# Patient Record
Sex: FEMALE | URBAN - METROPOLITAN AREA
[De-identification: names, ages, dates, MRNs, and addresses within clinical notes are randomized per-mention and may not be internally consistent; named-entity substitution may affect disease eponyms.]

---

## 2024-09-30 ENCOUNTER — TRANSCRIBE ORDERS (OUTPATIENT)
Dept: ADMINISTRATIVE | Age: 64
End: 2024-09-30

## 2024-09-30 DIAGNOSIS — C43.9 MALIGNANT MELANOMA, UNSPECIFIED SITE (HCC): ICD-10-CM

## 2024-09-30 DIAGNOSIS — C79.31 SECONDARY MALIGNANT NEOPLASM OF BRAIN AND SPINAL CORD (HCC): Primary | ICD-10-CM

## 2024-09-30 DIAGNOSIS — C79.49 SECONDARY MALIGNANT NEOPLASM OF BRAIN AND SPINAL CORD (HCC): Primary | ICD-10-CM

## 2025-02-03 ENCOUNTER — CLINICAL DOCUMENTATION (OUTPATIENT)
Dept: RADIATION ONCOLOGY | Age: 65
End: 2025-02-03

## 2025-02-03 ENCOUNTER — TRANSCRIBE ORDERS (OUTPATIENT)
Dept: ADMINISTRATIVE | Age: 65
End: 2025-02-03

## 2025-02-03 DIAGNOSIS — C79.31 METASTASIS TO BRAIN (HCC): Primary | ICD-10-CM

## 2025-02-03 NOTE — PROGRESS NOTES
Patient was seen today for Gamma Knife consult as requested by Dr. Stallings.  After consultation patient has decided to go forward with Gamma Knife radiosurgery.  Patient also received Dr. Ling's biography.    Patient's allergies, medications, and medical and surgery history were reviewed.  Also reviewed pre-procedure instructions and provided patient with a written copy of the same.  Patient will call with date of his/her H&P.    We discussed the patient's date of procedure will be on 2/10/25.    All patient's questions were answered.     Encouraged to call with any further questions or concerns.    Nichelle Elias RN

## 2025-02-07 ENCOUNTER — PRE-PROCEDURE TELEPHONE (OUTPATIENT)
Dept: RADIATION ONCOLOGY | Age: 65
End: 2025-02-07

## 2025-02-07 RX ORDER — ONDANSETRON 8 MG/1
8 TABLET, ORALLY DISINTEGRATING ORAL EVERY 8 HOURS PRN
COMMUNITY

## 2025-02-07 RX ORDER — FLUTICASONE PROPIONATE 50 MCG
1 SPRAY, SUSPENSION (ML) NASAL DAILY PRN
COMMUNITY

## 2025-02-07 RX ORDER — CARVEDILOL 25 MG/1
25 TABLET ORAL 2 TIMES DAILY WITH MEALS
COMMUNITY

## 2025-02-07 RX ORDER — FLUTICASONE FUROATE, UMECLIDINIUM BROMIDE AND VILANTEROL TRIFENATATE 200; 62.5; 25 UG/1; UG/1; UG/1
1 POWDER RESPIRATORY (INHALATION) DAILY
COMMUNITY

## 2025-02-07 RX ORDER — LISINOPRIL 20 MG/1
20 TABLET ORAL DAILY
COMMUNITY

## 2025-02-07 RX ORDER — AMLODIPINE BESYLATE 5 MG/1
5 TABLET ORAL DAILY
COMMUNITY

## 2025-02-07 RX ORDER — ALBUTEROL SULFATE 90 UG/1
2 INHALANT RESPIRATORY (INHALATION) EVERY 6 HOURS PRN
COMMUNITY

## 2025-02-07 RX ORDER — ATORVASTATIN CALCIUM 80 MG/1
80 TABLET, FILM COATED ORAL DAILY
COMMUNITY

## 2025-02-07 RX ORDER — ASPIRIN 81 MG/1
81 TABLET, CHEWABLE ORAL DAILY
COMMUNITY

## 2025-02-07 RX ORDER — GABAPENTIN 400 MG/1
800 CAPSULE ORAL 3 TIMES DAILY
COMMUNITY

## 2025-02-07 RX ORDER — HYDROCHLOROTHIAZIDE 25 MG/1
25 TABLET ORAL DAILY
COMMUNITY

## 2025-02-07 NOTE — PROGRESS NOTES
INSTRUCTIONS FOR THE DAY OF GAMMA KNIFE PROCEDURE AT [facility]    1.  Arrive at 6:00 a.m. to register.  2.  DO NOT eat or drink anything after midnight the night before your procedure.  3.  Take all of your usual morning medications the day of the procedure with just a sip of water including dexamethasone, keppra, protonix.   4.  If you are on any blood thinners (Coumadin, Xarelto, Aspirin, Lovenox), you MAY TAKE those medication.  There is no need to stop these medications for your procedure.  5.  If you need pain medication during the night before or the morning of your procedure, you may take them with a sip of water.    6.  Bring a current list of all of your medications with you.  7.  Do not wear any make-up.  8.  Wear comfortable, loose-fitting clothing.  9.  Do not wear jewelry, belts, or any clothing that contains metal.  10.  Visitors will be limited to 1 per patient.    YOU MUST HAVE SOMEONE DRIVE YOU HOME AFTER YOUR PROCEDURE.    Reviewed all pre-procedure instructions with family member via telephone.  Answered all questions.    Maite Horta RN

## 2025-02-10 ENCOUNTER — HOSPITAL ENCOUNTER (OUTPATIENT)
Dept: RADIATION ONCOLOGY | Age: 65
Discharge: HOME | End: 2025-02-10
Payer: MEDICARE

## 2025-02-10 ENCOUNTER — ANESTHESIA (OUTPATIENT)
Dept: RADIATION ONCOLOGY | Age: 65
End: 2025-02-10
Payer: MEDICARE

## 2025-02-10 ENCOUNTER — HOSPITAL ENCOUNTER (OUTPATIENT)
Dept: MRI IMAGING | Age: 65
Discharge: HOME | End: 2025-02-10
Payer: MEDICARE

## 2025-02-10 ENCOUNTER — ANESTHESIA EVENT (OUTPATIENT)
Dept: RADIATION ONCOLOGY | Age: 65
End: 2025-02-10
Payer: MEDICARE

## 2025-02-10 VITALS
RESPIRATION RATE: 20 BRPM | BODY MASS INDEX: 29.26 KG/M2 | HEIGHT: 62 IN | OXYGEN SATURATION: 93 % | HEART RATE: 96 BPM | TEMPERATURE: 98.1 F | DIASTOLIC BLOOD PRESSURE: 69 MMHG | WEIGHT: 159 LBS | SYSTOLIC BLOOD PRESSURE: 152 MMHG

## 2025-02-10 DIAGNOSIS — C79.31 METASTASIS TO BRAIN (HCC): ICD-10-CM

## 2025-02-10 LAB
PERFORMED ON: NORMAL
POC CREATININE: 0.6 MG/DL (ref 0.6–1.2)
POC SAMPLE TYPE: NORMAL

## 2025-02-10 PROCEDURE — 6360000002 HC RX W HCPCS

## 2025-02-10 PROCEDURE — 77300 RADIATION THERAPY DOSE PLAN: CPT

## 2025-02-10 PROCEDURE — 2500000003 HC RX 250 WO HCPCS: Performed by: RADIOLOGY

## 2025-02-10 PROCEDURE — 6360000004 HC RX CONTRAST MEDICATION: Performed by: NEUROLOGICAL SURGERY

## 2025-02-10 PROCEDURE — 70553 MRI BRAIN STEM W/O & W/DYE: CPT

## 2025-02-10 PROCEDURE — A9576 INJ PROHANCE MULTIPACK: HCPCS | Performed by: NEUROLOGICAL SURGERY

## 2025-02-10 PROCEDURE — 77295 3-D RADIOTHERAPY PLAN: CPT

## 2025-02-10 PROCEDURE — 77371 SRS MULTISOURCE: CPT

## 2025-02-10 PROCEDURE — 2580000003 HC RX 258

## 2025-02-10 PROCEDURE — 82565 ASSAY OF CREATININE: CPT

## 2025-02-10 PROCEDURE — 7100000011 HC PHASE II RECOVERY - ADDTL 15 MIN

## 2025-02-10 PROCEDURE — 7100000010 HC PHASE II RECOVERY - FIRST 15 MIN

## 2025-02-10 PROCEDURE — 77334 RADIATION TREATMENT AID(S): CPT

## 2025-02-10 PROCEDURE — 3700000000 HC ANESTHESIA ATTENDED CARE: Performed by: ANESTHESIOLOGY

## 2025-02-10 PROCEDURE — 6360000002 HC RX W HCPCS: Performed by: RADIOLOGY

## 2025-02-10 PROCEDURE — 3700000001 HC ADD 15 MINUTES (ANESTHESIA): Performed by: ANESTHESIOLOGY

## 2025-02-10 RX ORDER — BUPIVACAINE HYDROCHLORIDE 5 MG/ML
30 INJECTION, SOLUTION EPIDURAL; INTRACAUDAL ONCE
Status: COMPLETED | OUTPATIENT
Start: 2025-02-10 | End: 2025-02-10

## 2025-02-10 RX ORDER — LIDOCAINE HYDROCHLORIDE 20 MG/ML
INJECTION, SOLUTION INFILTRATION; PERINEURAL
Status: DISCONTINUED | OUTPATIENT
Start: 2025-02-10 | End: 2025-02-10 | Stop reason: SDUPTHER

## 2025-02-10 RX ORDER — 0.9 % SODIUM CHLORIDE 0.9 %
500 INTRAVENOUS SOLUTION INTRAVENOUS ONCE
Status: DISCONTINUED | OUTPATIENT
Start: 2025-02-10 | End: 2025-02-11 | Stop reason: HOSPADM

## 2025-02-10 RX ORDER — ACETAMINOPHEN 325 MG/1
650 TABLET ORAL EVERY 4 HOURS PRN
Status: DISCONTINUED | OUTPATIENT
Start: 2025-02-10 | End: 2025-02-11 | Stop reason: HOSPADM

## 2025-02-10 RX ORDER — SODIUM CHLORIDE 9 MG/ML
INJECTION, SOLUTION INTRAMUSCULAR; INTRAVENOUS; SUBCUTANEOUS
Status: DISCONTINUED | OUTPATIENT
Start: 2025-02-10 | End: 2025-02-10 | Stop reason: SDUPTHER

## 2025-02-10 RX ORDER — SODIUM BICARBONATE 42 MG/ML
1.5 INJECTION, SOLUTION INTRAVENOUS ONCE
Status: COMPLETED | OUTPATIENT
Start: 2025-02-10 | End: 2025-02-10

## 2025-02-10 RX ORDER — LORAZEPAM 2 MG/ML
1 INJECTION INTRAMUSCULAR ONCE
OUTPATIENT
Start: 2025-02-10

## 2025-02-10 RX ORDER — HEPARIN 100 UNIT/ML
500 SYRINGE INTRAVENOUS
Status: COMPLETED | OUTPATIENT
Start: 2025-02-10 | End: 2025-02-10

## 2025-02-10 RX ORDER — ONDANSETRON 2 MG/ML
4 INJECTION INTRAMUSCULAR; INTRAVENOUS EVERY 6 HOURS PRN
Status: DISCONTINUED | OUTPATIENT
Start: 2025-02-10 | End: 2025-02-11 | Stop reason: HOSPADM

## 2025-02-10 RX ORDER — PROPOFOL 10 MG/ML
INJECTION, EMULSION INTRAVENOUS
Status: DISCONTINUED | OUTPATIENT
Start: 2025-02-10 | End: 2025-02-10 | Stop reason: SDUPTHER

## 2025-02-10 RX ORDER — LORAZEPAM 2 MG/ML
INJECTION INTRAMUSCULAR
Status: COMPLETED
Start: 2025-02-10 | End: 2025-02-10

## 2025-02-10 RX ORDER — NEOMYCIN/BACITRACIN/POLYMYXINB 3.5-400-5K
OINTMENT (GRAM) TOPICAL 2 TIMES DAILY
Status: DISCONTINUED | OUTPATIENT
Start: 2025-02-10 | End: 2025-02-11 | Stop reason: HOSPADM

## 2025-02-10 RX ORDER — DEXAMETHASONE SODIUM PHOSPHATE 4 MG/ML
4 INJECTION, SOLUTION INTRA-ARTICULAR; INTRALESIONAL; INTRAMUSCULAR; INTRAVENOUS; SOFT TISSUE ONCE
Status: COMPLETED | OUTPATIENT
Start: 2025-02-10 | End: 2025-02-10

## 2025-02-10 RX ADMIN — Medication 500 UNITS: at 12:57

## 2025-02-10 RX ADMIN — SODIUM BICARBONATE 1.5 MEQ: 42 INJECTION, SOLUTION INTRAVENOUS at 09:25

## 2025-02-10 RX ADMIN — PROPOFOL 30 MG: 10 INJECTION, EMULSION INTRAVENOUS at 09:30

## 2025-02-10 RX ADMIN — PROPOFOL 50 MG: 10 INJECTION, EMULSION INTRAVENOUS at 09:25

## 2025-02-10 RX ADMIN — GADOTERIDOL 32 ML: 279.3 INJECTION, SOLUTION INTRAVENOUS at 08:37

## 2025-02-10 RX ADMIN — LIDOCAINE HYDROCHLORIDE 30 ML: 10 INJECTION, SOLUTION EPIDURAL; INFILTRATION; INTRACAUDAL; PERINEURAL at 09:25

## 2025-02-10 RX ADMIN — PROPOFOL 20 MG: 10 INJECTION, EMULSION INTRAVENOUS at 09:33

## 2025-02-10 RX ADMIN — SODIUM CHLORIDE 40 ML: 9 INJECTION, SOLUTION INTRAMUSCULAR; INTRAVENOUS; SUBCUTANEOUS at 09:38

## 2025-02-10 RX ADMIN — LORAZEPAM 1 MG: 2 INJECTION INTRAMUSCULAR; INTRAVENOUS at 07:33

## 2025-02-10 RX ADMIN — BUPIVACAINE HYDROCHLORIDE 150 MG: 5 INJECTION, SOLUTION EPIDURAL; INTRACAUDAL; PERINEURAL at 09:25

## 2025-02-10 RX ADMIN — LIDOCAINE HYDROCHLORIDE 100 MG: 20 INJECTION, SOLUTION INFILTRATION; PERINEURAL at 09:25

## 2025-02-10 RX ADMIN — DEXAMETHASONE SODIUM PHOSPHATE 4 MG: 4 INJECTION INTRA-ARTICULAR; INTRALESIONAL; INTRAMUSCULAR; INTRAVENOUS; SOFT TISSUE at 09:25

## 2025-02-10 ASSESSMENT — PAIN DESCRIPTION - FREQUENCY: FREQUENCY: CONTINUOUS

## 2025-02-10 ASSESSMENT — PAIN DESCRIPTION - ORIENTATION: ORIENTATION: LEFT

## 2025-02-10 ASSESSMENT — PAIN DESCRIPTION - DESCRIPTORS: DESCRIPTORS: ACHING

## 2025-02-10 ASSESSMENT — PAIN DESCRIPTION - LOCATION: LOCATION: LEG

## 2025-02-10 ASSESSMENT — PAIN DESCRIPTION - ONSET: ONSET: ON-GOING

## 2025-02-10 ASSESSMENT — PAIN SCALES - GENERAL
PAINLEVEL_OUTOF10: 0
PAINLEVEL_OUTOF10: 7

## 2025-02-10 ASSESSMENT — PAIN - FUNCTIONAL ASSESSMENT: PAIN_FUNCTIONAL_ASSESSMENT: PREVENTS OR INTERFERES WITH MANY ACTIVE NOT PASSIVE ACTIVITIES

## 2025-02-10 ASSESSMENT — PAIN DESCRIPTION - PAIN TYPE: TYPE: CHRONIC PAIN

## 2025-02-10 NOTE — ANESTHESIA PRE PROCEDURE
Screen (If Applicable):  No results found for: \"ABORH\", \"LABANTI\"    Drug/Infectious Status (If Applicable):  No results found for: \"HIV\", \"HEPCAB\"    COVID-19 Screening (If Applicable): No results found for: \"COVID19\"        Anesthesia Evaluation    Airway: Mallampati: II  TM distance: >3 FB   Neck ROM: full  Mouth opening: > = 3 FB   Dental:          Pulmonary:   (+)  COPD:                                     Cardiovascular:    (+) hypertension:        Rhythm: regular  Rate: normal                    Neuro/Psych:   (+) CVA:            GI/Hepatic/Renal:             Endo/Other:    (+) malignancy/cancer.                 Abdominal:             Vascular:          Other Findings:       Anesthesia Plan      MAC     ASA 4       Induction: intravenous.      Anesthetic plan and risks discussed with patient.      Plan discussed with CRNA.                BARRIE COLEMAN MD   2/10/2025

## 2025-02-10 NOTE — PROGRESS NOTES
Patient arrived to Gamma Knife department alert and oriented x 4 with daughter and sons.   Patient is neurologically intact. PAC accessed without difficulty.  Initial vitals WNL, and patient reports chronic left leg pain that is aching constantly at a 7/10 on the pain scale.    KPS: 80    ECO    mFI-5:  2      Gamma Knife RN Pre-Procedure Checklist     1. Has the patient ever had any surgery on the head or neck?  No    -If yes, is the surgery site marked?  N/A    2. Has the patient ever received radiation treatment to the head or neck? No      -If yes, is the treatment summary and/or disk of treatment plan available? N/a      -If the patient has had previous Gamma Knife radiosurgery has the most recent imaging been reviewed in the Gamma Plan? N/a      3. Has the patient received chemotherapy or immunotherapy in the past month? No      -If yes, list the agent and date of last treatment.  N/a    4. Is patient 80 or older? No      -If yes, using all aluminum pins? Yes    5. List the procedure-specific medications taken by the patient this morning:   -Dexamethasone 4 mg    -Keppra 500mg    -Protonix 40 mg     6. What is the patient’s GFR? 101    -For GFR 0-30 => no contrast at MRI    -For GFR 31-59 => single dose contrast at MRI    -For GFR >60 => double dose contrast at MRI    7. IF FEMALE: Does the patient require a pregnancy test per [facility] policy?   no   -If yes, the result is: na    8. What is the patient's baseline CO2? 48        Adry Richards RN

## 2025-02-10 NOTE — PROGRESS NOTES
Gamma Knife Frame Placement Time Out     1.  Patient states name and birthdate correctly? Yes    2. Procedure listed on consent:  G-Frame placement and Gamma Knife radiosurgery for Multiple Brain Metastases     3.  Is this the correct procedure?  Yes    4.  Are the consents signed?  Yes    5. Is this a trigeminal neuralgia case? No    -If yes, what side are we treating? N/A    -If yes, is the side marked for laterality?  N/A    6.  Have films been reviewed today?  Yes    7.  Has the interim History and Physical form been completed? yes    8.  Has the neurosurgeon reviewed the Gamma Knife RN Pre-Procedure Checklist?  Yes    9.  FEMALES ONLY: Does the patient require a pregnancy test per [facility] policy? no     -If yes, the result was:  na    10.  Are all present in agreement?  Yes    Those present for time out:  Dr. Ling, Adry Richards, RN, Nichelle Elias, CHASITY, Ellie Mullins CRNA,     Adry Richards, RN

## 2025-02-10 NOTE — PROGRESS NOTES
Patient received MAC under the supervision of Dr. Wilks and Ellie Mullins CRNA.  This RN was present throughout MAC and assumed care from anesthesia for Phase II recovery.  The patient is awake and breathing easily.  Patient denies pain.     See Flow Sheet for vitals and Joy Score.    Adry Richards RN

## 2025-02-10 NOTE — PROGRESS NOTES
Patient was taken to MRI by this RN where SpO2 and pulse were monitored and remained stable throughout.  Patient tolerated the study well.

## 2025-02-10 NOTE — ANESTHESIA POSTPROCEDURE EVALUATION
Department of Anesthesiology  Postprocedure Note    Patient: Lola Bettencourt  MRN: [id number]  YOB: 1960  Date of evaluation: 2/10/2025    Procedure Summary       Date: 02/10/25 Room / Location: [facility] Gamma Knife    Anesthesia Start: 0913 Anesthesia Stop: 0940    Procedure: GAMMAKNIFE W ANESTHESIA Diagnosis:     Scheduled Providers: Matthias Wilks MD Responsible Provider: Matthias Wilks MD    Anesthesia Type: MAC ASA Status: 4            Anesthesia Type: No value filed.    Joy Phase I: Joy Score: 10    Joy Phase II:      Anesthesia Post Evaluation    Patient location during evaluation: PACU  Patient participation: complete - patient participated  Level of consciousness: awake  Airway patency: patent  Nausea & Vomiting: no nausea and no vomiting  Cardiovascular status: blood pressure returned to baseline and hemodynamically stable  Respiratory status: acceptable  Hydration status: euvolemic  Multimodal analgesia pain management approach  Pain management: adequate    No notable events documented.

## 2025-02-10 NOTE — OP NOTE
THE [facility]  PATIENT NAME:   Lola Bettencourt   MR #:  [id number]  YOB: 1960   ACCOUNT #:  [id number]  SURGEON:  Rivera Ling MD   ADMIT DATE:  2/10/2025  SERVICE:  Neurosurgery  DICTATED BY: Rivera Ling MD   SURGERY DATE:  2/10/2025           OPERATIVE REPORT       PREOPERATIVE DIAGNOSIS:     1. Multiple brain metastases (melanoma)     POSTOPERATIVE DIAGNOSIS:     1. Same    PROCEDURE(S) PERFORMED:    1. Placement of stereotactic head frame   2. Gamma Knife radiosurgery for two brain metastases    NEUROSURGEON: Rivera Ling MD       RADIATION ONCOLOGIST: Timothy Stallings MD    ANESTHESIA: Moderate sedation    COMPLICATIONS: None    INDICATIONS: This is a 64-year-old woman with melanoma diagnosed in April 2020 and treated with BRAF inhibitors and immunotherapy. A recent MRI scan showed two hemorrhagic brain metastases. We discussed various treatment options and ultimately recommended Gamma Knife radiosurgery. The patient was aware of the potential benefits in terms of tumor control and the possible risks including (but not limited to): pin site bleeding/swelling/infection, brain swelling, seizures, bleeding, new neurological symptoms, and/or radiation injury of the brain.    PERFORMANCE STATUS: 80%    SYSTEMIC DISEASE: Stable    DETAILS OF PROCEDURE: The patient underwent a radiosurgery fusion MRI scan prior to the procedure. The four pin sites were cleaned with Chloraprep and injected with a mixture of Lidocaine 1%, Marcaine 0.5%, and sodium bicarbonate. The stereotactic head frame was secured with appropriate pins. The patient then underwent a cone beam CT scan that was fused with the MRI scan. The target and critical structures were contoured. An optimal treatment plan was developed by the neurosurgeon, radiation oncologist, and medical physicist. All critical structure constraints were fulfilled.    A cone-beam CT scan was performed in the treatment position to confirm set-up

## 2025-02-10 NOTE — PROGRESS NOTES
Gamma Knife Radiation Delivery Time Out    1.  Patient states name and birthdate correctly? Yes    2.  Procedure listed on consent Stereotactic Radiosurgery, Gamma Knife for Multiple Brain Metastases     3. Is this the correct procedure? Yes    4. Is this a trigeminal neuralgia case? No    -If yes, what side are we treating? N/A    -If yes, is the side marked for laterality?  N/A    5.  Has the final radiologist report been reviewed? yes    6.  Does the patient require Keppra prior to treatment delivery? yes    7.  Does the patient require Ativan prior to treatment delivery?  no    8.  Are all present in agreement?  Yes    9. Those present for time out:  Dr. Chandrakant Carter, Adry Richards, RN    Adry Richards, RN

## 2025-02-10 NOTE — PROGRESS NOTES
Patient brought to Gamma Knife suite and placed on treatment couch.  AV monitoring in place and verified verbally with patient from console. Continuous SpO2 and pulse monitor visible and monitored by this RN.    Adry Richards RN

## 2025-02-10 NOTE — H&P
The patient was seen and examined. There have been no changes in the patient's condition since the history and physical.    I reviewed the benefits, risks, and alternatives and the patient consents to the procedure.    Rivera Ling MD

## 2025-02-10 NOTE — PROGRESS NOTES
After Gamma Knife procedure, the G-frame was removed by Adry Richards RN and Nichelle Elias RN and fixation pin sites were observed for bleeding. None was noted.  Pin sites were cleaned with alcohol and povidone-iodine.  Dr. Ling then placed sutures at all four pin sites.     Patient met Phase II discharge criteria.  Baseline neurological status unchanged.  See discharge Joy score and vitals.    Discharge instructions were reviewed with patient and son Kevin who verbalized understanding using teach back method. A written copy of the instructions along with a steroid taper calendar was also given. Patient has follow up appointments scheduled.    Patient was accompanied to transportation by this RN.    Adry Richards RN

## 2025-02-10 NOTE — DISCHARGE INSTRUCTIONS
sites, fever >101 F for more than 24 hours), call Dr. Ling's office at [phone].  Follow up with your physician as directed.  Please contact Dr. Ling at [phone] with any new vision changes, balance problems, numbness/tingling, or severe headache. If you have an emergent concern and need to be seen by a physician immediately, please go to [facility] emergency room.  Steroid taper:  take your evening dose of dexamethasone tonight as directed. Starting tomorrow,    - take 4mg TWICE a day for THREE days, then    - take 4mg ONCE a day for THREE days, then    - take 2mg ONCE a day for THREE days, then STOP  Continue taking your Keppra as prescribed until your follow up phone call.  Continue taking your Protonix while on dexamethasone.     If you have any questions during regular business hours, call the Gamma Knife nurse at [phone].  For questions during off-business hours and on weekends, contact Dr. Ling's office directly at [phone].For César Mcmillan, Chandrakant or Cody call [phone].

## 2025-02-11 ENCOUNTER — POST-OP TELEPHONE (OUTPATIENT)
Dept: RADIATION ONCOLOGY | Age: 65
End: 2025-02-11

## 2025-02-11 NOTE — ONCOLOGY
Gamma Knife Radiosurgery Procedure Note      Patient: Lola Bettencourt  Date: 2/10/2025    Diagnosis:Multifocal brain Metastases    Procedure: Gamma Knife Based Stereotactic Radiosurgery (SRS)    Description of procedure:  Lola was met at the [facility] at 0600 on 2/10/2025 by Dr. Ling and myself. [Timothy Stallings MD]    MRI brain w wo was completed. MAC anesthesia was induced and frame placement was completed by Dr. Ling. A stereotactic CBCT was completed just prior to treatment.    Dosimetry of SRS is summarized as follows:   Two brain metastases (L frontal and L parietal) were treated, both to 20 Gy (see MediaTab for details).    Dosimetry was reviewed by \"Dr. Ling, Santiago Cruz MS (special physics consult requested) and myself. Treatment was then given successfully. The frame was removed without complication. The patient was discharged home in stable condition. She was placed on a steroid taper. She will followup with me oiforest 3 mo with reimaging.      Timothy Stallings MD

## 2025-02-11 NOTE — PROGRESS NOTES
Spoke to patient POD #1 from Gamma Knife radiosurgery.  Patient was not home at time of call. Spoke with patient daughter, Vanita who was with her at treatment yesterday. Daughter advised to have patient call if she has any questions, new symptoms, or needs anything at all. Vanita advised that there will be another follow-up phone call on scheduled for 02/24/25, but not to hesitate to call if they need anything and to call with any new neurological symptoms. Reinforced education about using ice-pack for swelling. Vanita verbalized understanding.     Adry Richards RN

## 2025-02-24 ENCOUNTER — POST-OP TELEPHONE (OUTPATIENT)
Dept: RADIATION ONCOLOGY | Age: 65
End: 2025-02-24

## 2025-02-24 NOTE — PROGRESS NOTES
Two week follow-up phone call with patient has been completed.   Pt denies any issues with the pin sites, states that the sutures have almost dissolved.    Successfully completed steroid taper without issues  3.   Has been instructed on the Keppra taper and should complete the taper by 03/03/25  4.   Has been reminded of their next appointment with Dr. Stallings on 05/12/2025 at 3:15pm and MRI same day at 2:00 pm.  5.   Any other questions or further follow up will be with Dr. Stallings.    Thank you for referring this patient to the Gamma Knife Department for treatment, it has been a pleasure to participate in their care.  If the patient requires future Gamma Knife procedures please contact the department directly at [phone].